# Patient Record
Sex: MALE | Race: WHITE | Employment: FULL TIME | URBAN - METROPOLITAN AREA
[De-identification: names, ages, dates, MRNs, and addresses within clinical notes are randomized per-mention and may not be internally consistent; named-entity substitution may affect disease eponyms.]

---

## 2017-09-26 ENCOUNTER — PATIENT MESSAGE (OUTPATIENT)
Dept: INTERNAL MEDICINE CLINIC | Facility: CLINIC | Age: 57
End: 2017-09-26

## 2017-09-26 NOTE — TELEPHONE ENCOUNTER
From: Anh Miller  To: Maria G Villa MD  Sent: 9/26/2017 8:38 AM CDT  Subject: Referral Request    Have developed a snoring issue. Could you please recommend an ENT?  Thanks

## 2017-10-12 ENCOUNTER — OFFICE VISIT (OUTPATIENT)
Dept: INTERNAL MEDICINE CLINIC | Facility: CLINIC | Age: 57
End: 2017-10-12

## 2017-10-12 VITALS
BODY MASS INDEX: 28.04 KG/M2 | HEART RATE: 60 BPM | SYSTOLIC BLOOD PRESSURE: 122 MMHG | DIASTOLIC BLOOD PRESSURE: 82 MMHG | WEIGHT: 207 LBS | HEIGHT: 72 IN | RESPIRATION RATE: 16 BRPM

## 2017-10-12 DIAGNOSIS — J30.1 CHRONIC SEASONAL ALLERGIC RHINITIS DUE TO POLLEN: ICD-10-CM

## 2017-10-12 DIAGNOSIS — R06.83 SNORING: Primary | ICD-10-CM

## 2017-10-12 PROCEDURE — 99212 OFFICE O/P EST SF 10 MIN: CPT | Performed by: INTERNAL MEDICINE

## 2017-10-12 PROCEDURE — 99214 OFFICE O/P EST MOD 30 MIN: CPT | Performed by: INTERNAL MEDICINE

## 2017-10-12 NOTE — PROGRESS NOTES
Beny Kincaid is a 65year old male. HPI:   1. Snoring    Wife states that patient snores regularly and stops breathing occasionally at nite. Does not v seem to have much daytime sedation or mental cloudiness associated.  Mouth occasionally dry in AM b without murmur  GI: good BS's,no masses, HSM or tenderness  EXTREMITIES: no cyanosis, clubbing or edema    ASSESSMENT AND PLAN:   1. Snoring    Has some snoring and stops breathing at nite per wife. Will check sleep study to see if obstruction present.

## 2017-11-02 ENCOUNTER — OFFICE VISIT (OUTPATIENT)
Dept: SLEEP CENTER | Age: 57
End: 2017-11-02
Attending: INTERNAL MEDICINE
Payer: COMMERCIAL

## 2017-11-02 DIAGNOSIS — Z76.89 SLEEP CONCERN: Primary | ICD-10-CM

## 2017-11-02 PROCEDURE — 95810 POLYSOM 6/> YRS 4/> PARAM: CPT

## 2017-11-04 NOTE — PROCEDURES
[address] [facility]  Accredited by the [facility] of Sleep Medicine (AASM)    PATIENT'S NAME: Bernadette Lin   ATTENDING PHYSICIAN: Claudia Xiong MD   REFERRING PHYSICIAN: Mata Andre MD   PATIENT ACCOUNT #: [de-identified] [facility] spontaneous arousal index is 8.4 events per hour, for a combined arousal index of 13.7 events per hour.   There were 23 periodic limb movements for a periodic limb movement index of 4 events per hour, of which 0.4 per hour were associated with arousal.  Low

## 2017-11-07 ENCOUNTER — TELEPHONE (OUTPATIENT)
Dept: OTHER | Age: 57
End: 2017-11-07

## 2017-11-07 DIAGNOSIS — G47.30 SLEEP APNEA, UNSPECIFIED TYPE: Primary | ICD-10-CM

## 2017-11-08 ENCOUNTER — PATIENT MESSAGE (OUTPATIENT)
Dept: INTERNAL MEDICINE CLINIC | Facility: CLINIC | Age: 57
End: 2017-11-08

## 2017-11-08 ENCOUNTER — TELEPHONE (OUTPATIENT)
Dept: INTERNAL MEDICINE CLINIC | Facility: CLINIC | Age: 57
End: 2017-11-08

## 2017-11-08 DIAGNOSIS — G47.30 SLEEP APNEA, UNSPECIFIED TYPE: Primary | ICD-10-CM

## 2017-11-08 NOTE — TELEPHONE ENCOUNTER
From: Lalo Umana  To: Kaylee Quan MD  Sent: 11/8/2017 3:15 PM CST  Subject: Test Results Question    There was confusion at the [facility] if you wanted an additional study with CPAP or if you just want to order a CPAP.  Would somebody ple

## 2017-11-08 NOTE — TELEPHONE ENCOUNTER
Charlene Begum from Sleep study calling to have order corrected for pt. Charlene Begum states that the procedure has to say sleep test with CPap titration. Currently the order says DME which dayne says is incorrect. .please advise

## 2017-11-08 NOTE — TELEPHONE ENCOUNTER
Patient returned call. Name and  verified. Patient informed of results and recommendations. Patient verbalized understanding. Order generated.

## 2017-11-08 NOTE — TELEPHONE ENCOUNTER
Please advise. Thank you. Was the order for CPAP DME or Sleep titration study?     CPAP Titration study order pended for MD approval.      Note from Orders call encounter from today as below;     Elias Watson from Sleep study calling to have order corrected for

## 2017-11-08 NOTE — TELEPHONE ENCOUNTER
I don't see that a cpap titration study was done with sleep test and I cant order a cpap machine if a cpap titration to know what settings to use has been done.  Why didn't they do the cpap titration at the time of the sleep study as I authorized so we coul

## 2017-11-09 ENCOUNTER — PATIENT MESSAGE (OUTPATIENT)
Dept: INTERNAL MEDICINE CLINIC | Facility: CLINIC | Age: 57
End: 2017-11-09

## 2017-11-10 NOTE — TELEPHONE ENCOUNTER
From: Svetlana Tiwari  To: Saud Henry MD  Sent: 11/9/2017 3:53 PM CST  Subject: Test Results Question    I forwarded my test results to my best friend who happens to practice pulmonology and sleep in [place].  He suggest that I don't need a

## 2017-11-17 ENCOUNTER — OFFICE VISIT (OUTPATIENT)
Dept: SLEEP CENTER | Age: 57
End: 2017-11-17
Attending: INTERNAL MEDICINE
Payer: COMMERCIAL

## 2017-11-17 DIAGNOSIS — Z76.89 SLEEP CONCERN: Primary | ICD-10-CM

## 2017-11-17 PROCEDURE — 95811 POLYSOM 6/>YRS CPAP 4/> PARM: CPT

## 2017-11-19 NOTE — PROCEDURES
[address] [facility]  Accredited by the [facility] of [facility] Medicine (AASM)    PATIENT'S NAME: Lucia Tony   ATTENDING PHYSICIAN: Marie Castillo MD   REFERRING PHYSICIAN: Marie Castillo MD   PATIENT ACCOUNT #: [de-identified] Naheed Yadav 238 movements for a periodic limb movement index of 8 events per hour of which none were associated with arousal.  The lowest desaturation was to 90%.   The average heart rate was 59 beats per minute, and there was no significant bradycardia, asystole, tachycar

## 2017-11-28 ENCOUNTER — PATIENT MESSAGE (OUTPATIENT)
Dept: INTERNAL MEDICINE CLINIC | Facility: CLINIC | Age: 57
End: 2017-11-28

## 2017-11-28 DIAGNOSIS — G47.30 SEVERE SLEEP APNEA: Primary | ICD-10-CM

## 2017-11-28 NOTE — TELEPHONE ENCOUNTER
----- Message from Yudi Apodaca MD sent at 11/21/2017  3:55 AM CST -----  Patient has significant sleep apnea as seen on recent sleep test and will improve on a CPAP Machine.  Please call his Durable Medical Equipment (DME) provider through insurance

## 2017-11-28 NOTE — TELEPHONE ENCOUNTER
LMTCB- x to triage  CPAP order, LOV notes, sleep study, face sheet, and insurance info sent to [facility].

## 2017-11-28 NOTE — TELEPHONE ENCOUNTER
Spoke to patient and went over Dr. Rubens Rivera message below. Let patient know that the CPAP order had been sent to [facility] and they should be calling him soon. Pt verbalized understanding.

## 2017-12-05 ENCOUNTER — PATIENT MESSAGE (OUTPATIENT)
Dept: INTERNAL MEDICINE CLINIC | Facility: CLINIC | Age: 57
End: 2017-12-05

## 2017-12-06 NOTE — TELEPHONE ENCOUNTER
Left voicemail with [facility] at [phone] requesting update for this patient. Please update patient when response is provided.

## 2017-12-06 NOTE — TELEPHONE ENCOUNTER
From: Alin Watson  To: Jose De Jesus Bush MD  Sent: 12/5/2017 1:08 PM CST  Subject: Other    No word from CPAP supplier. You asked for me to check back after a week. Been a week and a day.  Thanks

## 2017-12-14 ENCOUNTER — MED REC SCAN ONLY (OUTPATIENT)
Dept: INTERNAL MEDICINE CLINIC | Facility: CLINIC | Age: 57
End: 2017-12-14

## 2018-04-16 ENCOUNTER — OFFICE VISIT (OUTPATIENT)
Dept: INTERNAL MEDICINE CLINIC | Facility: CLINIC | Age: 58
End: 2018-04-16

## 2018-04-16 VITALS
DIASTOLIC BLOOD PRESSURE: 79 MMHG | WEIGHT: 222 LBS | RESPIRATION RATE: 16 BRPM | SYSTOLIC BLOOD PRESSURE: 121 MMHG | HEART RATE: 58 BPM | HEIGHT: 72 IN | BODY MASS INDEX: 30.07 KG/M2

## 2018-04-16 DIAGNOSIS — Z00.00 PHYSICAL EXAM, ANNUAL: Primary | ICD-10-CM

## 2018-04-16 PROCEDURE — 99396 PREV VISIT EST AGE 40-64: CPT | Performed by: INTERNAL MEDICINE

## 2018-04-16 RX ORDER — EPINEPHRINE 0.15 MG/.15ML
0.15 INJECTION SUBCUTANEOUS AS NEEDED
Qty: 1 DEVICE | Refills: 1 | Status: SHIPPED | OUTPATIENT
Start: 2018-04-16 | End: 2019-05-28

## 2018-04-16 NOTE — PROGRESS NOTES
Carlos Hill is a 63year old male who presents for a complete physical exam.   HPI:   Pt complains of nothing.       Immunization History  Administered            Date(s) Administered    Influenza Vaccine, No Preserv, 3YR +                          10 removal of non-malignant ankle bone  2009: OTHER SURGICAL HISTORY      Comment: per Tom (ECD) : dx \"left great toe\"; px                \"surgical bone growth\"   Family History   Problem Relation Age of Onset   • Prostate Cancer Father 68   • Cancer auscultation  CARDIO: RRR without murmur  GI: good BS's,no masses, HSM or tenderness  : two descended testes,no masses,no hernia,no penile lesions  MUSCULOSKELETAL: back is not tender,FROM of the back  EXTREMITIES: no cyanosis, clubbing or edema  NEURO:

## 2018-06-04 ENCOUNTER — PATIENT MESSAGE (OUTPATIENT)
Dept: INTERNAL MEDICINE CLINIC | Facility: CLINIC | Age: 58
End: 2018-06-04

## 2018-06-04 NOTE — TELEPHONE ENCOUNTER
From: Cruzita Phoenix  To: Kole Bailey MD  Sent: 6/4/2018 8:38 AM CDT  Subject: Test Results Question    Just realized that I have not gone in to take my tests as a follow-up to the physical. I will do that on Friday (out through Thursday this wee

## 2018-07-11 ENCOUNTER — PATIENT MESSAGE (OUTPATIENT)
Dept: INTERNAL MEDICINE CLINIC | Facility: CLINIC | Age: 58
End: 2018-07-11

## 2018-07-11 NOTE — TELEPHONE ENCOUNTER
From: Ally Rivera  To: Vidya Kuhn MD  Sent: 7/11/2018 11:30 AM CDT  Subject: Prescription Question    Talked to [employer] about covering a travel CPAP, as suggested by Dr. Lorena Hickey. They are asking for a procedure code. Can you provide?  Thanks

## 2018-07-11 NOTE — TELEPHONE ENCOUNTER
Please pend order for travelling CPAP machine and pend to me with diagnosis of obstructive sleep apnea.   Dr STACY

## 2018-07-24 ENCOUNTER — PATIENT MESSAGE (OUTPATIENT)
Dept: INTERNAL MEDICINE CLINIC | Facility: CLINIC | Age: 58
End: 2018-07-24

## 2018-07-24 ENCOUNTER — TELEPHONE (OUTPATIENT)
Dept: OTHER | Age: 58
End: 2018-07-24

## 2018-07-24 DIAGNOSIS — G47.33 OBSTRUCTIVE SLEEP APNEA (ADULT) (PEDIATRIC): Primary | ICD-10-CM

## 2018-07-24 NOTE — TELEPHONE ENCOUNTER
From: Van Chong  To: Lisa Gooden MD  Sent: 7/24/2018 2:35 PM CDT  Subject: Prescription Question    Can Dr. Tate Renteria provide a procedure code to me for a travel CPAP or does [facility] need to call him directly to obtain?  Thanks

## 2018-07-24 NOTE — TELEPHONE ENCOUNTER
Please see below. [employer] messages regarding pending traveling CPAP machine. Lpn/Thuan Foreman MD      7/11/18 5:29 PM   Note      Please pend order for travelling CPAP machine and pend to me with diagnosis of obstructive sleep apnea.

## 2018-09-09 ENCOUNTER — PATIENT MESSAGE (OUTPATIENT)
Dept: INTERNAL MEDICINE CLINIC | Facility: CLINIC | Age: 58
End: 2018-09-09

## 2018-09-10 ENCOUNTER — PATIENT MESSAGE (OUTPATIENT)
Dept: INTERNAL MEDICINE CLINIC | Facility: CLINIC | Age: 58
End: 2018-09-10

## 2018-09-10 NOTE — TELEPHONE ENCOUNTER
Please see patient's email and advise.     Please respond to pool: EM [facility] LPN/CMA

## 2018-09-10 NOTE — TELEPHONE ENCOUNTER
From: Misty Mcginnis  To: Checo Oakley MD  Sent: 9/10/2018 3:10 PM CDT  Subject: Prescription Question    Dr. Nitesh Martinez asked that I forward to you (the nursing staff) the name of my home medical provider so that you/they can order a travel CPAP.

## 2018-09-10 NOTE — TELEPHONE ENCOUNTER
From: Duke Adams  To: Yanelis Kelly MD  Sent: 9/9/2018 10:32 AM CDT  Subject: Prescription Question    Do I need a prescription for a travel CPAP? If so, would you please prescribe. [place] 2 [place].  Thanks

## 2018-09-10 NOTE — TELEPHONE ENCOUNTER
Please see patient's email and advise.     Please respond to pool: EM [facility] LPN/CMA

## 2018-09-11 NOTE — TELEPHONE ENCOUNTER
Please take care of pending a portable cpap for patient at current settings for upcoming tripo.   Dr STACY

## 2018-09-11 NOTE — TELEPHONE ENCOUNTER
Dr Tate Renteria-- there is existing 7/25/18 order in chart.     Please respond to pool: EM [facility] LPN/MICHAEL-- see doctor's order, there is 7/25/18 order for travel CPAP in chart--please fax to [place]

## 2018-09-11 NOTE — TELEPHONE ENCOUNTER
Please forward cpap order fro 7/25 to [facility] express DME provider he mentions in all the emails to me.   Dr STACY

## 2018-09-11 NOTE — TELEPHONE ENCOUNTER
There is a phone encounter that exists \"CPAP Issues\" that concerns this too.  Forwarded that phone encounter to triage pool so prior auth RN and [place] can assist.

## 2018-10-04 NOTE — TELEPHONE ENCOUNTER
Spoke with Mikaela at [facility] and patient decided he did not want the travel CPAP machine due to the cost as of 9/28/2018.

## 2018-10-22 ENCOUNTER — PATIENT MESSAGE (OUTPATIENT)
Dept: INTERNAL MEDICINE CLINIC | Facility: CLINIC | Age: 58
End: 2018-10-22

## 2018-10-23 NOTE — TELEPHONE ENCOUNTER
From: Cruzita Phoenix  To: Kole Bailey MD  Sent: 10/22/2018 4:57 PM CDT  Subject: Other    I did get a flu vaccine last week. Please update records accordingly. Thanks!

## 2019-03-08 ENCOUNTER — PATIENT MESSAGE (OUTPATIENT)
Dept: INTERNAL MEDICINE CLINIC | Facility: CLINIC | Age: 59
End: 2019-03-08

## 2019-03-08 NOTE — TELEPHONE ENCOUNTER
From: Kari Simpson  To: Tarah Gray MD  Sent: 3/8/2019 4:22 PM CST  Subject: Other    Just received the 2nd shingles booster (Shingrex).  Please add to my records

## 2019-04-05 ENCOUNTER — PATIENT MESSAGE (OUTPATIENT)
Dept: INTERNAL MEDICINE CLINIC | Facility: CLINIC | Age: 59
End: 2019-04-05

## 2019-04-05 NOTE — TELEPHONE ENCOUNTER
From: Linh Banks  To: Jalen Coreas MD  Sent: 4/5/2019 8:09 AM CDT  Subject: Visit Follow-up Question    I will not be able to make today's appointment and will reschedule. Stomach flu last night. Apologies.

## 2019-05-28 ENCOUNTER — OFFICE VISIT (OUTPATIENT)
Dept: INTERNAL MEDICINE CLINIC | Facility: CLINIC | Age: 59
End: 2019-05-28
Payer: COMMERCIAL

## 2019-05-28 VITALS
SYSTOLIC BLOOD PRESSURE: 152 MMHG | HEIGHT: 73 IN | BODY MASS INDEX: 28.76 KG/M2 | WEIGHT: 217 LBS | RESPIRATION RATE: 16 BRPM | DIASTOLIC BLOOD PRESSURE: 104 MMHG

## 2019-05-28 DIAGNOSIS — Z00.00 PHYSICAL EXAM, ANNUAL: Primary | ICD-10-CM

## 2019-05-28 DIAGNOSIS — E66.3 OVERWEIGHT (BMI 25.0-29.9): ICD-10-CM

## 2019-05-28 PROCEDURE — 99396 PREV VISIT EST AGE 40-64: CPT | Performed by: INTERNAL MEDICINE

## 2019-05-28 RX ORDER — EPINEPHRINE 0.15 MG/.15ML
0.15 INJECTION SUBCUTANEOUS AS NEEDED
Qty: 1 DEVICE | Refills: 1 | Status: SHIPPED | OUTPATIENT
Start: 2019-05-28 | End: 2019-10-01

## 2019-05-28 NOTE — PROGRESS NOTES
Van Chong is a 63year old male who presents for a complete physical exam.   HPI:   Pt complains of nothing.       Immunization History  Administered            Date(s) Administered    FLUZONE 3 Yrs+ Quad Prsv Free 0.5 ml (60316) History:   Procedure Laterality Date   • COLONOSCOPY N/A 12/2/2016    Performed by Jose Barrett MD at [address] [place]    removal of non-malignant ankle bone   • OTHER SURGICAL HISTORY  2009    per [employer] (ECD) : emilio Dominguez clear  EYES:PERRLA, EOMI, normal optic disk,conjunctiva are clear  NECK: supple,no adenopathy,no bruits  CHEST: no chest tenderness  BREAST: no dominant or suspicious mass  LUNGS: clear to auscultation  CARDIO: RRR without murmur  GI: good BS's,no masses,

## 2019-06-06 ENCOUNTER — HOSPITAL ENCOUNTER (OUTPATIENT)
Dept: NUTRITION | Facility: HOSPITAL | Age: 59
Discharge: HOME | End: 2019-06-06
Attending: INTERNAL MEDICINE
Payer: COMMERCIAL

## 2019-06-06 DIAGNOSIS — E66.3 OVERWEIGHT (BMI 25.0-29.9): ICD-10-CM

## 2019-06-06 PROCEDURE — 97802 MEDICAL NUTRITION INDIV IN: CPT | Performed by: DIETITIAN, REGISTERED

## 2019-06-06 NOTE — PROGRESS NOTES
Nutrition Assessment    Luba Pool is a 63year old male. Referred by:  Attending  Referring Physician Name: Gema Bojorquez     Medical Nutrition Therapy Comment: Overweight  Visit Information: Initial Visit 6/6/19    [facility] CHILDREN [facility]OL[facility]CENTS Management Packet Includes: Calorie/Carb Counting, My Plate, Food Label Reading and Food Journal      NUTRITION PRESCRIPTION:      Needs:  1850 Calorie     90 gm Protein      Oral Diet Prescription: Balanced CHO 1800 Calorie      Goals     Nutrition Goals:

## 2019-07-31 ENCOUNTER — HOSPITAL ENCOUNTER (OUTPATIENT)
Dept: NUTRITION | Facility: HOSPITAL | Age: 59
Discharge: HOME | End: 2019-07-31
Attending: INTERNAL MEDICINE
Payer: COMMERCIAL

## 2019-07-31 DIAGNOSIS — E66.3 OVERWEIGHT (BMI 25.0-29.9): ICD-10-CM

## 2019-07-31 PROCEDURE — 97803 MED NUTRITION INDIV SUBSEQ: CPT | Performed by: DIETITIAN, REGISTERED

## 2019-07-31 NOTE — PROGRESS NOTES
Nutrition Assessment     Duke Adams is a 63year old male.     Referred by:  Attending  Referring Physician Name: Yehuda Soulier Assessment      Medical Nutrition Therapy Comment: Overweight  Visit Information: Follow Up Visit 7/31/19     Buddy Brady activity like walking for 30 minutes or more to have heart healthy benefit of 150 minutes per week of activity     Nutrition Diagnosis: Overweight a evidneced by BMI of 28     Intervention      Nutrition Education: Priority Modification  Nutrition/Diet [place]

## 2019-10-01 ENCOUNTER — HOSPITAL ENCOUNTER (OUTPATIENT)
Dept: NUTRITION | Facility: HOSPITAL | Age: 59
Discharge: HOME | End: 2019-10-01
Attending: INTERNAL MEDICINE
Payer: COMMERCIAL

## 2019-10-01 DIAGNOSIS — E66.3 OVERWEIGHT (BMI 25.0-29.9): ICD-10-CM

## 2019-10-01 PROCEDURE — 97803 MED NUTRITION INDIV SUBSEQ: CPT | Performed by: DIETITIAN, REGISTERED

## 2019-10-01 NOTE — PROGRESS NOTES
Nutrition Assessment     Jessica Marquis a 63year old male.     Referred by: Attending  Referring Physician Name: Ethan Lagunas     Assessment      Medical Nutrition Therapy Comment: Overweight  Visit Information: Follow Up Visit 10/1/19     ANTHR for 30 minutes or more to have heart healthy benefit of 150 minutes per week of activity     Nutrition Diagnosis: Overweight a evidneced by BMI of 28     Intervention      Nutrition Education: Priority Modification  Nutrition/Diet Handouts Given: Alonzo Allan

## 2019-10-02 RX ORDER — EPINEPHRINE 0.15 MG/.15ML
0.15 INJECTION SUBCUTANEOUS AS NEEDED
Qty: 1 DEVICE | Refills: 1 | Status: SHIPPED | OUTPATIENT
Start: 2019-10-02 | End: 2020-06-30

## 2019-10-02 NOTE — TELEPHONE ENCOUNTER
Review pended refill request as it does not fall under a protocol.   Requested Prescriptions     Pending Prescriptions Disp Refills   • EPINEPHrine 0.15 MG/0.15ML Injection Solution Auto-injector 1 Device 1     Sig: Inject 0.15 mL (1 each total) into the mu

## 2020-01-07 ENCOUNTER — HOSPITAL ENCOUNTER (OUTPATIENT)
Dept: NUTRITION | Facility: HOSPITAL | Age: 60
Discharge: HOME | End: 2020-01-07
Attending: INTERNAL MEDICINE
Payer: COMMERCIAL

## 2020-01-07 DIAGNOSIS — E66.3 OVERWEIGHT (BMI 25.0-29.9): ICD-10-CM

## 2020-01-07 PROCEDURE — 97803 MED NUTRITION INDIV SUBSEQ: CPT | Performed by: DIETITIAN, REGISTERED

## 2020-01-07 NOTE — PROGRESS NOTES
Nutrition Assessment     Germaine Westford a 63year old male.     Referred by: Attending  Referring Physician [address]     Assessment      Medical Nutrition Therapy Comment: Overweight  Visit Information: Follow Up Visit 1/7/20     BRAXTON 150 minutes per week of activity     Nutrition Diagnosis: Overweight a evidneced by BMI of 28     Intervention      Nutrition Education: Priority Modification  Nutrition/Diet Handouts Given: Goal Sheet      NUTRITION PRESCRIPTION:      Needs:  1500 Calorie

## 2020-06-30 ENCOUNTER — LAB ENCOUNTER (OUTPATIENT)
Dept: LAB | Age: 60
End: 2020-06-30
Attending: INTERNAL MEDICINE
Payer: COMMERCIAL

## 2020-06-30 ENCOUNTER — OFFICE VISIT (OUTPATIENT)
Dept: INTERNAL MEDICINE CLINIC | Facility: CLINIC | Age: 60
End: 2020-06-30
Payer: COMMERCIAL

## 2020-06-30 VITALS
BODY MASS INDEX: 27.96 KG/M2 | TEMPERATURE: 98 F | SYSTOLIC BLOOD PRESSURE: 138 MMHG | DIASTOLIC BLOOD PRESSURE: 74 MMHG | HEART RATE: 60 BPM | WEIGHT: 211 LBS | HEIGHT: 73 IN

## 2020-06-30 DIAGNOSIS — Z00.00 PHYSICAL EXAM, ANNUAL: ICD-10-CM

## 2020-06-30 DIAGNOSIS — Z00.00 PHYSICAL EXAM, ANNUAL: Primary | ICD-10-CM

## 2020-06-30 DIAGNOSIS — R59.0 ENLARGED LYMPH NODE IN NECK: ICD-10-CM

## 2020-06-30 LAB
ALBUMIN SERPL-MCNC: 4.5 G/DL (ref 3.4–5)
ALBUMIN/GLOB SERPL: 1.5 {RATIO} (ref 1–2)
ALP LIVER SERPL-CCNC: 40 U/L (ref 45–117)
ALT SERPL-CCNC: 30 U/L (ref 16–61)
ANION GAP SERPL CALC-SCNC: 8 MMOL/L (ref 0–18)
AST SERPL-CCNC: 16 U/L (ref 15–37)
BASOPHILS # BLD AUTO: 0.05 X10(3) UL (ref 0–0.2)
BASOPHILS NFR BLD AUTO: 0.9 %
BILIRUB SERPL-MCNC: 0.7 MG/DL (ref 0.1–2)
BILIRUB UR QL: NEGATIVE
BUN BLD-MCNC: 15 MG/DL (ref 7–18)
BUN/CREAT SERPL: 14.6 (ref 10–20)
CALCIUM BLD-MCNC: 9.7 MG/DL (ref 8.5–10.1)
CHLORIDE SERPL-SCNC: 103 MMOL/L (ref 98–112)
CHOLEST SMN-MCNC: 209 MG/DL (ref ?–200)
CLARITY UR: CLEAR
CO2 SERPL-SCNC: 29 MMOL/L (ref 21–32)
COLOR UR: YELLOW
COMPLEXED PSA SERPL-MCNC: 1.63 NG/ML (ref ?–4)
CREAT BLD-MCNC: 1.03 MG/DL (ref 0.7–1.3)
DEPRECATED RDW RBC AUTO: 39.6 FL (ref 35.1–46.3)
EOSINOPHIL # BLD AUTO: 0.07 X10(3) UL (ref 0–0.7)
EOSINOPHIL NFR BLD AUTO: 1.2 %
ERYTHROCYTE [DISTWIDTH] IN BLOOD BY AUTOMATED COUNT: 11.6 % (ref 11–15)
GLOBULIN PLAS-MCNC: 3 G/DL (ref 2.8–4.4)
GLUCOSE BLD-MCNC: 97 MG/DL (ref 70–99)
GLUCOSE UR-MCNC: NEGATIVE MG/DL
HCT VFR BLD AUTO: 44 % (ref 39–53)
HDLC SERPL-MCNC: 60 MG/DL (ref 40–59)
HGB BLD-MCNC: 15.3 G/DL (ref 13–17.5)
HGB UR QL STRIP.AUTO: NEGATIVE
IMM GRANULOCYTES # BLD AUTO: 0.02 X10(3) UL (ref 0–1)
IMM GRANULOCYTES NFR BLD: 0.3 %
KETONES UR-MCNC: NEGATIVE MG/DL
LDLC SERPL CALC-MCNC: 124 MG/DL (ref ?–100)
LEUKOCYTE ESTERASE UR QL STRIP.AUTO: NEGATIVE
LYMPHOCYTES # BLD AUTO: 1.07 X10(3) UL (ref 1–4)
LYMPHOCYTES NFR BLD AUTO: 18.7 %
M PROTEIN MFR SERPL ELPH: 7.5 G/DL (ref 6.4–8.2)
MCH RBC QN AUTO: 32.9 PG (ref 26–34)
MCHC RBC AUTO-ENTMCNC: 34.8 G/DL (ref 31–37)
MCV RBC AUTO: 94.6 FL (ref 80–100)
MONOCYTES # BLD AUTO: 0.4 X10(3) UL (ref 0.1–1)
MONOCYTES NFR BLD AUTO: 7 %
NEUTROPHILS # BLD AUTO: 4.11 X10 (3) UL (ref 1.5–7.7)
NEUTROPHILS # BLD AUTO: 4.11 X10(3) UL (ref 1.5–7.7)
NEUTROPHILS NFR BLD AUTO: 71.9 %
NITRITE UR QL STRIP.AUTO: NEGATIVE
NONHDLC SERPL-MCNC: 149 MG/DL (ref ?–130)
OSMOLALITY SERPL CALC.SUM OF ELEC: 291 MOSM/KG (ref 275–295)
PATIENT FASTING Y/N/NP: YES
PATIENT FASTING Y/N/NP: YES
PH UR: 6 [PH] (ref 5–8)
PLATELET # BLD AUTO: 301 10(3)UL (ref 150–450)
POTASSIUM SERPL-SCNC: 4.5 MMOL/L (ref 3.5–5.1)
PROT UR-MCNC: NEGATIVE MG/DL
RBC # BLD AUTO: 4.65 X10(6)UL (ref 4.3–5.7)
SODIUM SERPL-SCNC: 140 MMOL/L (ref 136–145)
SP GR UR STRIP: 1.01 (ref 1–1.03)
TRIGL SERPL-MCNC: 126 MG/DL (ref 30–149)
TSI SER-ACNC: 2.67 MIU/ML (ref 0.36–3.74)
UROBILINOGEN UR STRIP-ACNC: <2
VLDLC SERPL CALC-MCNC: 25 MG/DL (ref 0–30)
WBC # BLD AUTO: 5.7 X10(3) UL (ref 4–11)

## 2020-06-30 PROCEDURE — 36415 COLL VENOUS BLD VENIPUNCTURE: CPT

## 2020-06-30 PROCEDURE — 85025 COMPLETE CBC W/AUTO DIFF WBC: CPT

## 2020-06-30 PROCEDURE — 80053 COMPREHEN METABOLIC PANEL: CPT

## 2020-06-30 PROCEDURE — 84443 ASSAY THYROID STIM HORMONE: CPT

## 2020-06-30 PROCEDURE — 81003 URINALYSIS AUTO W/O SCOPE: CPT

## 2020-06-30 PROCEDURE — 99396 PREV VISIT EST AGE 40-64: CPT | Performed by: INTERNAL MEDICINE

## 2020-06-30 PROCEDURE — 80061 LIPID PANEL: CPT

## 2020-06-30 RX ORDER — EPINEPHRINE 0.15 MG/.15ML
0.15 INJECTION SUBCUTANEOUS AS NEEDED
Qty: 1 DEVICE | Refills: 1 | Status: SHIPPED | OUTPATIENT
Start: 2020-06-30

## 2020-06-30 NOTE — PROGRESS NOTES
Guille Kruger is a 62year old male who presents for a complete physical exam.   HPI:   Pt complains of nothing.     Immunization History  Administered            Date(s) Administered    FLUZONE 3 Yrs+ Quad Prsv Free 0.5 ml (99447) Surgical History:   Procedure Laterality Date   • COLONOSCOPY N/A 12/2/2016    Performed by Rachid Lala MD at [address] [place]    removal of non-malignant ankle bone   • OTHER SURGICAL HISTORY  2009    per Tom (Javi Menard) are clear  EYES:PERRLA, EOMI, normal optic disk,conjunctiva are clear  NECK: supple,MILD RIGHT SIDED LYMPHADENOPATHY,,no bruits  CHEST: no chest tenderness  BREAST: no dominant or suspicious mass  LUNGS: clear to auscultation  CARDIO: RRR without murmur  G

## 2021-04-13 ENCOUNTER — HOSPITAL ENCOUNTER (OUTPATIENT)
Dept: GENERAL RADIOLOGY | Facility: HOSPITAL | Age: 61
Discharge: HOME | End: 2021-04-13
Attending: INTERNAL MEDICINE
Payer: COMMERCIAL

## 2021-04-13 ENCOUNTER — OFFICE VISIT (OUTPATIENT)
Dept: INTERNAL MEDICINE CLINIC | Facility: CLINIC | Age: 61
End: 2021-04-13
Payer: COMMERCIAL

## 2021-04-13 ENCOUNTER — EKG ENCOUNTER (OUTPATIENT)
Dept: LAB | Age: 61
End: 2021-04-13
Attending: INTERNAL MEDICINE
Payer: COMMERCIAL

## 2021-04-13 VITALS
SYSTOLIC BLOOD PRESSURE: 147 MMHG | HEART RATE: 72 BPM | DIASTOLIC BLOOD PRESSURE: 98 MMHG | HEIGHT: 72 IN | BODY MASS INDEX: 29.39 KG/M2 | WEIGHT: 217 LBS | RESPIRATION RATE: 16 BRPM

## 2021-04-13 DIAGNOSIS — R07.2 PRECORDIAL PAIN: ICD-10-CM

## 2021-04-13 DIAGNOSIS — R07.2 PRECORDIAL PAIN: Primary | ICD-10-CM

## 2021-04-13 PROCEDURE — 71046 X-RAY EXAM CHEST 2 VIEWS: CPT | Performed by: INTERNAL MEDICINE

## 2021-04-13 PROCEDURE — 3080F DIAST BP >= 90 MM HG: CPT | Performed by: INTERNAL MEDICINE

## 2021-04-13 PROCEDURE — 3008F BODY MASS INDEX DOCD: CPT | Performed by: INTERNAL MEDICINE

## 2021-04-13 PROCEDURE — 93010 ELECTROCARDIOGRAM REPORT: CPT | Performed by: INTERNAL MEDICINE

## 2021-04-13 PROCEDURE — 99213 OFFICE O/P EST LOW 20 MIN: CPT | Performed by: INTERNAL MEDICINE

## 2021-04-13 PROCEDURE — 93005 ELECTROCARDIOGRAM TRACING: CPT

## 2021-04-13 PROCEDURE — 3077F SYST BP >= 140 MM HG: CPT | Performed by: INTERNAL MEDICINE

## 2021-04-13 NOTE — PROGRESS NOTES
Alonso Cha is a 62year old male. HPI:     1. Precordial pain    Patient has been suffering from a 3/10 discomfort and achy pain in the right upper chest near the clavicle of the past several days.   The patient is recently resumed starting weight lif Resp 16   Ht 6' (1.829 m)   Wt 217 lb (98.4 kg)   BMI 29.43 kg/m²   GENERAL: well developed, well nourished,in no apparent distress  SKIN: no rashes,no suspicious lesions  HEENT: atraumatic, normocephalic,ears and throat are clear  NECK: supple,no adenopat

## 2021-04-19 ENCOUNTER — PATIENT MESSAGE (OUTPATIENT)
Dept: INTERNAL MEDICINE CLINIC | Facility: CLINIC | Age: 61
End: 2021-04-19

## 2021-08-18 ENCOUNTER — TELEPHONE (OUTPATIENT)
Dept: INTERNAL MEDICINE CLINIC | Facility: CLINIC | Age: 61
End: 2021-08-18

## 2021-08-18 NOTE — TELEPHONE ENCOUNTER
Pt calling stating that the CPAP that he was originally prescribed by Dr Kyle Livingston is now recalled. Pt requesting order for new one or advisement for getting new one.    Advised pt he will probably need to est care with new PCP but pt did not want to at the

## 2021-08-18 NOTE — TELEPHONE ENCOUNTER
Left message for patient, new machines are not being dispensed for patients who currently have a recall item due to short supply of machines. Patient will need to contact Fabiola Irby to register device and for any questions he might have. [email or web address].

## 2021-08-20 NOTE — TELEPHONE ENCOUNTER
From   Cena Aschoff, RN To   Kesha Benjamin and Delivered   8/19/2021 10:03 AM   Last Read in MyChart   8/19/2021 10:06 AM by Suzanne Tatum

## 2021-10-04 ENCOUNTER — TELEPHONE (OUTPATIENT)
Dept: GASTROENTEROLOGY | Facility: CLINIC | Age: 61
End: 2021-10-04

## 2021-10-04 NOTE — TELEPHONE ENCOUNTER
----- Message from Abebe Naqvi [address] sent at 12/5/2016  9:27 AM CST -----  Regarding: Recall colon  Recall patient for colon in 5 years per [facility].

## (undated) NOTE — LETTER
10/4/2021    Xi Beavers [address] [place] [address]            Dear Xi Mc,      Our records indicate that you are due for an appointment for a Colonoscopy in December 2021, or shortly there after, with a physician at [facility]

## (undated) NOTE — LETTER
05/16/18        [address]      Dear Tom Copeland,    [facility] records indicate that you have outstanding lab work and or testing that was ordered for you and has not yet been completed:          CBC W Differential W [place]